# Patient Record
(demographics unavailable — no encounter records)

---

## 2025-02-12 NOTE — HISTORY OF PRESENT ILLNESS
[FreeTextEntry1] : pt comes for annual exam . She has lesions on the left side of her vagina . It started with pain then blister and burning . Never had anything like that in the past . She is not sexually active . She had a mammogram .

## 2025-02-12 NOTE — PHYSICAL EXAM
[Appropriately responsive] : appropriately responsive [Alert] : alert [No Acute Distress] : no acute distress [No Lymphadenopathy] : no lymphadenopathy [Regular Rate Rhythm] : regular rate rhythm [No Murmurs] : no murmurs [Clear to Auscultation B/L] : clear to auscultation bilaterally [Soft] : soft [Non-tender] : non-tender [Non-distended] : non-distended [No HSM] : No HSM [No Lesions] : no lesions [No Mass] : no mass [Oriented x3] : oriented x3 [Examination Of The Breasts] : a normal appearance [No Masses] : no breast masses were palpable [Vulvar Atrophy] : vulvar atrophy [Number of Vesicles: ___] : [unfilled] [All Stages of Development] : in all stages of development [Grouped] : that were grouped [(Area # ___ on Diagram)] : (area  #[unfilled] on diagram) [Labia Majora] : normal [Labia Minora] : normal [Atrophy] : atrophy [Normal] : normal [Uterine Adnexae] : normal

## 2025-03-25 NOTE — HISTORY OF PRESENT ILLNESS
[FreeTextEntry1] : PT COMES FOR F/U OF LABS, DISCUSSED THE RESULTS OF THE HERPES. She SAID THAT IT IS RECURRENT. i DISCUSSED MEDICATION TO TRY AND DECREASE THE RECURRENCES .